# Patient Record
Sex: MALE | Race: WHITE | Employment: OTHER | URBAN - METROPOLITAN AREA
[De-identification: names, ages, dates, MRNs, and addresses within clinical notes are randomized per-mention and may not be internally consistent; named-entity substitution may affect disease eponyms.]

---

## 2021-05-03 DIAGNOSIS — Z01.812 BLOOD TESTS PRIOR TO TREATMENT OR PROCEDURE: ICD-10-CM

## 2021-05-03 RX ORDER — CEFAZOLIN SODIUM 2 G/50ML
2 SOLUTION INTRAVENOUS ONCE
Status: CANCELLED | OUTPATIENT
Start: 2021-05-11 | End: 2021-05-11

## 2021-05-06 ENCOUNTER — HOSPITAL ENCOUNTER (OUTPATIENT)
Dept: PREADMISSION TESTING | Age: 66
Discharge: HOME | End: 2021-05-06
Payer: MEDICARE

## 2021-05-06 PROCEDURE — U0003 INFECTIOUS AGENT DETECTION BY NUCLEIC ACID (DNA OR RNA); SEVERE ACUTE RESPIRATORY SYNDROME CORONAVIRUS 2 (SARS-COV-2) (CORONAVIRUS DISEASE [COVID-19]), AMPLIFIED PROBE TECHNIQUE, MAKING USE OF HIGH THROUGHPUT TECHNOLOGIES AS DESCRIBED BY CMS-2020-01-R: HCPCS

## 2021-05-07 LAB — SARS-COV-2, COV2NT: NOT DETECTED

## 2021-05-10 ENCOUNTER — ANESTHESIA EVENT (OUTPATIENT)
Dept: SURGERY | Age: 66
End: 2021-05-10
Payer: MEDICARE

## 2021-05-11 ENCOUNTER — APPOINTMENT (OUTPATIENT)
Dept: GENERAL RADIOLOGY | Age: 66
End: 2021-05-11
Attending: UROLOGY
Payer: MEDICARE

## 2021-05-11 ENCOUNTER — ANESTHESIA (OUTPATIENT)
Dept: SURGERY | Age: 66
End: 2021-05-11
Payer: MEDICARE

## 2021-05-11 ENCOUNTER — HOSPITAL ENCOUNTER (OUTPATIENT)
Age: 66
Discharge: HOME | End: 2021-05-11
Attending: UROLOGY | Admitting: UROLOGY
Payer: MEDICARE

## 2021-05-11 VITALS
RESPIRATION RATE: 16 BRPM | OXYGEN SATURATION: 94 % | TEMPERATURE: 98 F | SYSTOLIC BLOOD PRESSURE: 158 MMHG | DIASTOLIC BLOOD PRESSURE: 90 MMHG | BODY MASS INDEX: 32.07 KG/M2 | HEART RATE: 53 BPM | WEIGHT: 224 LBS | HEIGHT: 70 IN

## 2021-05-11 DIAGNOSIS — N20.0 KIDNEY STONE: Primary | ICD-10-CM

## 2021-05-11 LAB
AMPHET UR QL SCN: NEGATIVE
BARBITURATES UR QL SCN: NEGATIVE
BENZODIAZ UR QL: NEGATIVE
CANNABINOIDS UR QL SCN: POSITIVE
COCAINE UR QL SCN: NEGATIVE
HDSCOM,HDSCOM: ABNORMAL
METHADONE UR QL: NEGATIVE
OPIATES UR QL: NEGATIVE
PCP UR QL: NEGATIVE

## 2021-05-11 PROCEDURE — 74011000250 HC RX REV CODE- 250: Performed by: NURSE ANESTHETIST, CERTIFIED REGISTERED

## 2021-05-11 PROCEDURE — 74011250636 HC RX REV CODE- 250/636: Performed by: UROLOGY

## 2021-05-11 PROCEDURE — 74018 RADEX ABDOMEN 1 VIEW: CPT

## 2021-05-11 PROCEDURE — 77030013079 HC BLNKT BAIR HGGR 3M -A: Performed by: ANESTHESIOLOGY

## 2021-05-11 PROCEDURE — 74011250636 HC RX REV CODE- 250/636: Performed by: NURSE ANESTHETIST, CERTIFIED REGISTERED

## 2021-05-11 PROCEDURE — 77030006974 HC BSKT URET RTVR BSC -C: Performed by: UROLOGY

## 2021-05-11 PROCEDURE — C2617 STENT, NON-COR, TEM W/O DEL: HCPCS | Performed by: UROLOGY

## 2021-05-11 PROCEDURE — 76210000021 HC REC RM PH II 0.5 TO 1 HR: Performed by: UROLOGY

## 2021-05-11 PROCEDURE — 77030008683 HC TU ET CUF COVD -A: Performed by: ANESTHESIOLOGY

## 2021-05-11 PROCEDURE — 74011000636 HC RX REV CODE- 636: Performed by: UROLOGY

## 2021-05-11 PROCEDURE — 74011250637 HC RX REV CODE- 250/637: Performed by: NURSE ANESTHETIST, CERTIFIED REGISTERED

## 2021-05-11 PROCEDURE — 77030040361 HC SLV COMPR DVT MDII -B: Performed by: UROLOGY

## 2021-05-11 PROCEDURE — 80307 DRUG TEST PRSMV CHEM ANLYZR: CPT

## 2021-05-11 PROCEDURE — C1769 GUIDE WIRE: HCPCS | Performed by: UROLOGY

## 2021-05-11 PROCEDURE — 76010000162 HC OR TIME 1.5 TO 2 HR INTENSV-TIER 1: Performed by: UROLOGY

## 2021-05-11 PROCEDURE — 00910 ANES TRANSURETHRAL PX NOS: CPT | Performed by: ANESTHESIOLOGY

## 2021-05-11 PROCEDURE — C1894 INTRO/SHEATH, NON-LASER: HCPCS | Performed by: UROLOGY

## 2021-05-11 PROCEDURE — 77030040922 HC BLNKT HYPOTHRM STRY -A: Performed by: UROLOGY

## 2021-05-11 PROCEDURE — 76210000016 HC OR PH I REC 1 TO 1.5 HR: Performed by: UROLOGY

## 2021-05-11 PROCEDURE — 2709999900 HC NON-CHARGEABLE SUPPLY: Performed by: UROLOGY

## 2021-05-11 PROCEDURE — 76060000034 HC ANESTHESIA 1.5 TO 2 HR: Performed by: UROLOGY

## 2021-05-11 PROCEDURE — 77030019927 HC TBNG IRR CYSTO BAXT -A: Performed by: UROLOGY

## 2021-05-11 PROCEDURE — 00910 ANES TRANSURETHRAL PX NOS: CPT | Performed by: NURSE ANESTHETIST, CERTIFIED REGISTERED

## 2021-05-11 DEVICE — IMPLANTABLE DEVICE: Type: IMPLANTABLE DEVICE | Site: URETER | Status: FUNCTIONAL

## 2021-05-11 RX ORDER — LIDOCAINE HYDROCHLORIDE 10 MG/ML
0.1 INJECTION, SOLUTION EPIDURAL; INFILTRATION; INTRACAUDAL; PERINEURAL AS NEEDED
Status: DISCONTINUED | OUTPATIENT
Start: 2021-05-11 | End: 2021-05-11 | Stop reason: HOSPADM

## 2021-05-11 RX ORDER — KETOROLAC TROMETHAMINE 15 MG/ML
INJECTION, SOLUTION INTRAMUSCULAR; INTRAVENOUS AS NEEDED
Status: DISCONTINUED | OUTPATIENT
Start: 2021-05-11 | End: 2021-05-11 | Stop reason: HOSPADM

## 2021-05-11 RX ORDER — OXYCODONE AND ACETAMINOPHEN 5; 325 MG/1; MG/1
1 TABLET ORAL
Qty: 8 TAB | Refills: 0 | Status: SHIPPED | OUTPATIENT
Start: 2021-05-11 | End: 2021-05-11 | Stop reason: SDUPTHER

## 2021-05-11 RX ORDER — CEFAZOLIN SODIUM 2 G/50ML
2 SOLUTION INTRAVENOUS ONCE
Status: COMPLETED | OUTPATIENT
Start: 2021-05-11 | End: 2021-05-11

## 2021-05-11 RX ORDER — SODIUM CHLORIDE 0.9 % (FLUSH) 0.9 %
5-40 SYRINGE (ML) INJECTION AS NEEDED
Status: DISCONTINUED | OUTPATIENT
Start: 2021-05-11 | End: 2021-05-11 | Stop reason: HOSPADM

## 2021-05-11 RX ORDER — FAMOTIDINE 20 MG/1
20 TABLET, FILM COATED ORAL ONCE
Status: COMPLETED | OUTPATIENT
Start: 2021-05-11 | End: 2021-05-11

## 2021-05-11 RX ORDER — EPHEDRINE SULFATE/0.9% NACL/PF 50 MG/5 ML
SYRINGE (ML) INTRAVENOUS AS NEEDED
Status: DISCONTINUED | OUTPATIENT
Start: 2021-05-11 | End: 2021-05-11 | Stop reason: HOSPADM

## 2021-05-11 RX ORDER — GLYCOPYRROLATE 0.2 MG/ML
INJECTION INTRAMUSCULAR; INTRAVENOUS AS NEEDED
Status: DISCONTINUED | OUTPATIENT
Start: 2021-05-11 | End: 2021-05-11 | Stop reason: HOSPADM

## 2021-05-11 RX ORDER — DOCUSATE SODIUM 100 MG/1
100 CAPSULE, LIQUID FILLED ORAL 2 TIMES DAILY
Qty: 60 CAP | Refills: 2 | Status: SHIPPED | OUTPATIENT
Start: 2021-05-11 | End: 2021-08-09

## 2021-05-11 RX ORDER — MIDAZOLAM HYDROCHLORIDE 1 MG/ML
INJECTION, SOLUTION INTRAMUSCULAR; INTRAVENOUS AS NEEDED
Status: DISCONTINUED | OUTPATIENT
Start: 2021-05-11 | End: 2021-05-11 | Stop reason: HOSPADM

## 2021-05-11 RX ORDER — PROPOFOL 10 MG/ML
INJECTION, EMULSION INTRAVENOUS AS NEEDED
Status: DISCONTINUED | OUTPATIENT
Start: 2021-05-11 | End: 2021-05-11 | Stop reason: HOSPADM

## 2021-05-11 RX ORDER — OXYCODONE AND ACETAMINOPHEN 5; 325 MG/1; MG/1
1 TABLET ORAL AS NEEDED
Status: COMPLETED | OUTPATIENT
Start: 2021-05-11 | End: 2021-05-11

## 2021-05-11 RX ORDER — FENTANYL CITRATE 50 UG/ML
25 INJECTION, SOLUTION INTRAMUSCULAR; INTRAVENOUS AS NEEDED
Status: DISCONTINUED | OUTPATIENT
Start: 2021-05-11 | End: 2021-05-11 | Stop reason: HOSPADM

## 2021-05-11 RX ORDER — DEXTROSE 50 % IN WATER (D50W) INTRAVENOUS SYRINGE
25-50 AS NEEDED
Status: DISCONTINUED | OUTPATIENT
Start: 2021-05-11 | End: 2021-05-11 | Stop reason: HOSPADM

## 2021-05-11 RX ORDER — FENTANYL CITRATE 50 UG/ML
50 INJECTION, SOLUTION INTRAMUSCULAR; INTRAVENOUS
Status: DISCONTINUED | OUTPATIENT
Start: 2021-05-11 | End: 2021-05-11 | Stop reason: HOSPADM

## 2021-05-11 RX ORDER — SODIUM CHLORIDE, SODIUM LACTATE, POTASSIUM CHLORIDE, CALCIUM CHLORIDE 600; 310; 30; 20 MG/100ML; MG/100ML; MG/100ML; MG/100ML
25 INJECTION, SOLUTION INTRAVENOUS CONTINUOUS
Status: DISCONTINUED | OUTPATIENT
Start: 2021-05-11 | End: 2021-05-11 | Stop reason: HOSPADM

## 2021-05-11 RX ORDER — ROCURONIUM BROMIDE 10 MG/ML
INJECTION, SOLUTION INTRAVENOUS AS NEEDED
Status: DISCONTINUED | OUTPATIENT
Start: 2021-05-11 | End: 2021-05-11 | Stop reason: HOSPADM

## 2021-05-11 RX ORDER — LIDOCAINE HYDROCHLORIDE 20 MG/ML
INJECTION, SOLUTION EPIDURAL; INFILTRATION; INTRACAUDAL; PERINEURAL AS NEEDED
Status: DISCONTINUED | OUTPATIENT
Start: 2021-05-11 | End: 2021-05-11 | Stop reason: HOSPADM

## 2021-05-11 RX ORDER — OXYBUTYNIN CHLORIDE 5 MG/1
5 TABLET ORAL 3 TIMES DAILY
Qty: 30 TAB | Refills: 0 | Status: SHIPPED | OUTPATIENT
Start: 2021-05-11 | End: 2021-05-21

## 2021-05-11 RX ORDER — ONDANSETRON 2 MG/ML
INJECTION INTRAMUSCULAR; INTRAVENOUS AS NEEDED
Status: DISCONTINUED | OUTPATIENT
Start: 2021-05-11 | End: 2021-05-11 | Stop reason: HOSPADM

## 2021-05-11 RX ORDER — OXYCODONE AND ACETAMINOPHEN 5; 325 MG/1; MG/1
1 TABLET ORAL ONCE
Status: CANCELLED | OUTPATIENT
Start: 2021-05-11 | End: 2021-05-12

## 2021-05-11 RX ORDER — FENTANYL CITRATE 50 UG/ML
INJECTION, SOLUTION INTRAMUSCULAR; INTRAVENOUS AS NEEDED
Status: DISCONTINUED | OUTPATIENT
Start: 2021-05-11 | End: 2021-05-11 | Stop reason: HOSPADM

## 2021-05-11 RX ORDER — MAGNESIUM SULFATE 100 %
4 CRYSTALS MISCELLANEOUS AS NEEDED
Status: DISCONTINUED | OUTPATIENT
Start: 2021-05-11 | End: 2021-05-11 | Stop reason: HOSPADM

## 2021-05-11 RX ORDER — KETOROLAC TROMETHAMINE 10 MG/1
10 TABLET, FILM COATED ORAL
Qty: 20 TAB | Refills: 0 | Status: SHIPPED | OUTPATIENT
Start: 2021-05-11

## 2021-05-11 RX ORDER — SUCCINYLCHOLINE CHLORIDE 100 MG/5ML
SYRINGE (ML) INTRAVENOUS AS NEEDED
Status: DISCONTINUED | OUTPATIENT
Start: 2021-05-11 | End: 2021-05-11 | Stop reason: HOSPADM

## 2021-05-11 RX ORDER — NEOSTIGMINE METHYLSULFATE 1 MG/ML
INJECTION, SOLUTION INTRAVENOUS AS NEEDED
Status: DISCONTINUED | OUTPATIENT
Start: 2021-05-11 | End: 2021-05-11 | Stop reason: HOSPADM

## 2021-05-11 RX ORDER — SODIUM CHLORIDE 0.9 % (FLUSH) 0.9 %
5-40 SYRINGE (ML) INJECTION EVERY 8 HOURS
Status: DISCONTINUED | OUTPATIENT
Start: 2021-05-11 | End: 2021-05-11 | Stop reason: HOSPADM

## 2021-05-11 RX ADMIN — Medication 120 MG: at 10:08

## 2021-05-11 RX ADMIN — FENTANYL CITRATE 50 MCG: 50 INJECTION, SOLUTION INTRAMUSCULAR; INTRAVENOUS at 11:01

## 2021-05-11 RX ADMIN — LIDOCAINE HYDROCHLORIDE 40 MG: 20 INJECTION, SOLUTION EPIDURAL; INFILTRATION; INTRACAUDAL; PERINEURAL at 10:08

## 2021-05-11 RX ADMIN — ROCURONIUM BROMIDE 30 MG: 50 INJECTION INTRAVENOUS at 10:11

## 2021-05-11 RX ADMIN — Medication 10 MG: at 11:19

## 2021-05-11 RX ADMIN — GLYCOPYRROLATE 0.4 MG: 0.2 INJECTION INTRAMUSCULAR; INTRAVENOUS at 11:31

## 2021-05-11 RX ADMIN — SODIUM CHLORIDE, SODIUM LACTATE, POTASSIUM CHLORIDE, AND CALCIUM CHLORIDE 25 ML/HR: 600; 310; 30; 20 INJECTION, SOLUTION INTRAVENOUS at 09:46

## 2021-05-11 RX ADMIN — KETOROLAC TROMETHAMINE 15 MG: 15 INJECTION, SOLUTION INTRAMUSCULAR; INTRAVENOUS at 11:31

## 2021-05-11 RX ADMIN — FAMOTIDINE 20 MG: 20 TABLET, FILM COATED ORAL at 09:22

## 2021-05-11 RX ADMIN — FENTANYL CITRATE 50 MCG: 50 INJECTION, SOLUTION INTRAMUSCULAR; INTRAVENOUS at 10:08

## 2021-05-11 RX ADMIN — Medication 3 MG: at 11:31

## 2021-05-11 RX ADMIN — PROPOFOL 100 MG: 10 INJECTION, EMULSION INTRAVENOUS at 10:08

## 2021-05-11 RX ADMIN — ROCURONIUM BROMIDE 10 MG: 50 INJECTION INTRAVENOUS at 10:57

## 2021-05-11 RX ADMIN — MIDAZOLAM HYDROCHLORIDE 2 MG: 2 INJECTION, SOLUTION INTRAMUSCULAR; INTRAVENOUS at 09:58

## 2021-05-11 RX ADMIN — CEFAZOLIN SODIUM 2 G: 2 SOLUTION INTRAVENOUS at 10:14

## 2021-05-11 RX ADMIN — ONDANSETRON 4 MG: 2 INJECTION INTRAMUSCULAR; INTRAVENOUS at 11:31

## 2021-05-11 RX ADMIN — OXYCODONE HYDROCHLORIDE AND ACETAMINOPHEN 1 TABLET: 5; 325 TABLET ORAL at 13:31

## 2021-05-11 NOTE — DISCHARGE INSTRUCTIONS
Patient Education        Shock Wave Lithotripsy: What to Expect at Home  Your Recovery     Lithotripsy is a way to treat kidney stones without surgery. It is also called extracorporeal shock wave lithotripsy, or ESWL. This treatment uses sound waves to break kidney stones into tiny pieces. These pieces can then pass out of the body in the urine. You may have a small amount of blood in your urine after this treatment. Your urine may be slightly pink or reddish. The blood in the urine often goes away after 2 days. You may have a plastic tube inside one of your ureters. Ureters are the tubes that connect the kidneys to the bladder. The plastic tube is called a stent. It takes urine from your kidney to your bladder. This lets the stone pass more easily. Your doctor may remove the stent in about a week or two. This care sheet gives you a general idea about how long it will take for you to recover. But each person recovers at a different pace. Follow the steps below to feel better as quickly as possible. How can you care for yourself at home? Activity    · Rest as much as you need to after you go home.     · You may do your regular activities. But avoid hard exercise or sports for a week. Wait until there is no blood in your urine and the stent is out. Diet    · You can eat your normal diet.     · Drink plenty of fluids. If you have kidney, heart, or liver disease and have to limit fluids, talk with your doctor before you increase the amount of fluids you drink. Medicines    · Your doctor will tell you if and when you can restart your medicines. You will also be given instructions about taking any new medicines.     · If you take aspirin or some other blood thinner, ask your doctor if and when to start taking it again. Make sure that you understand exactly what your doctor wants you to do.     · Be safe with medicines. Read and follow all instructions on the label.   ? If the doctor gave you a prescription medicine for pain, take it as prescribed. ? If you are not taking a prescription pain medicine, ask your doctor if you can take acetaminophen (Tylenol). Do not take ibuprofen (Advil, Motrin) or naproxen (Aleve), or similar medicines unless your doctor tells you to. ? Do not take two or more pain medicines at the same time unless the doctor told you to. Many pain medicines have acetaminophen, which is Tylenol. Too much acetaminophen (Tylenol) can be harmful. Other instructions    · Urinate through the strainer the doctor gives you. Save any stone pieces, including those that look like sand or gravel. Take these to your doctor. This will help your doctor find the cause of your stones. Follow-up care is a key part of your treatment and safety. Be sure to make and go to all appointments, and call your doctor if you are having problems. It's also a good idea to know your test results and keep a list of the medicines you take. When should you call for help? Call 911 anytime you think you may need emergency care. For example, call if:    · You passed out (lost consciousness).     · You have chest pain, are short of breath, or cough up blood. Call your doctor now or seek immediate medical care if:    · You have pain that does not get better after you take pain medicine.     · You have new or more blood clots in your urine. (It is normal for the urine to be pink for a few days.)     · You cannot urinate.     · You have symptoms of a urinary tract infection. These may include:  ? Pain or burning when you urinate. ? A frequent need to urinate without being able to pass much urine. ? Pain in the flank, which is just below the rib cage and above the waist on either side of the back. ? Blood in the urine.   ? A fever.     · You are sick to your stomach or cannot drink fluids.     · You have signs of a blood clot in your leg (called a deep vein thrombosis), such as:  ? Pain in the calf, back of the knee, thigh, or groin.  ? Redness and swelling in your leg. Watch closely for any changes in your health, and be sure to contact your doctor if you have any problems. Where can you learn more? Go to http://www.gray.com/  Enter K751 in the search box to learn more about \"Shock Wave Lithotripsy: What to Expect at Home. \"  Current as of: December 17, 2020               Content Version: 12.8  © 2006-2021 [employer]. Care instructions adapted under license by [employer] (which disclaims liability or warranty for this information). If you have questions about a medical condition or this instruction, always ask your healthcare professional. [place] [address] any warranty or liability for your use of this information. Patient Education        Ureteral Stent Placement: What to Expect at Home  Your Recovery     A ureteral (say \"you-REE-ter-ul\") stent is a thin, hollow tube that is placed in the ureter to help urine pass from the kidney into the bladder. Ureters are the tubes that connect the kidneys to the bladder. You may have a small amount of blood in your urine for 1 to 3 days after the procedure. While the stent is in place, you may have to urinate more often, feel a sudden need to urinate, or feel like you can't completely empty your bladder. You may feel some pain when you urinate or do strenuous activity. You also may notice a small amount of blood in your urine after strenuous activities. These side effects usually don't prevent people from doing their normal daily activities. You may have a thin string coming out of your urethra. Your urethra is the tube that carries urine from your bladder to outside your body. This string is attached to the stent. Try not to pull on the string. It will be used to pull out the stent when you no longer need it. After the procedure, urine may flow better from your kidneys to your bladder.  A ureteral stent may be left in place for several days or for as long as several months. Your doctor will take it out when you no longer need it. Or, in some cases, it may be taken out at home. This care sheet gives you a general idea about how long it will take for you to recover. But each person recovers at a different pace. Follow the steps below to get better as quickly as possible. How can you care for yourself at home? Activity    · Rest when you feel tired. Getting enough sleep will help you recover.     · Avoid strenuous activities, such as bicycle riding, jogging, weight lifting, or aerobic exercise, until your doctor says it is okay.     · Ask your doctor when you can drive again.     · Most people are able to return to work the day after the procedure. If your work requires intense activity, you may feel pain in your kidney area or get tired easily. If this happens, you may need to do less strenuous activities while the stent is in.     · Ask your doctor when it is okay for you to have sex. Diet    · You can eat your normal diet. If your stomach is upset, try bland, low-fat foods like plain rice, broiled chicken, toast, and yogurt.     · Drink plenty of fluids (unless your doctor tells you not to). Medicines    · Your doctor will tell you if and when you can restart your medicines. You will also get instructions about taking any new medicines.     · If you take aspirin or some other blood thinner, ask your doctor if and when to start taking it again. Make sure that you understand exactly what your doctor wants you to do.     · Be safe with medicines. Take pain medicines exactly as directed. ? If the doctor gave you a prescription medicine for pain, take it as prescribed.   ? If you are not taking a prescription pain medicine, ask your doctor if you can take an over-the-counter medicine.     · If you think your pain medicine is making you sick to your stomach:  ? Take your medicine after meals (unless your doctor has told you not to). ? Ask your doctor for a different pain medicine.     · If your doctor prescribed antibiotics, take them as directed. Do not stop taking them just because you feel better. You need to take the full course of antibiotics. Follow-up care is a key part of your treatment and safety. Be sure to make and go to all appointments, and call your doctor if you are having problems. It's also a good idea to know your test results and keep a list of the medicines you take. When should you call for help? Call 911 anytime you think you may need emergency care. For example, call if:    · You passed out (lost consciousness).     · You have severe trouble breathing.     · You have sudden chest pain and shortness of breath, or you cough up blood.     · You have severe belly pain. Call your doctor now or seek immediate medical care if:    · Part or all of the stent comes out of your urethra.     · You have pain that does not get better after you take pain medicine.     · You have symptoms of a urinary infection. For example:  ? You have blood or pus in your urine. ? You have pain in your back just below your rib cage. This is called flank pain. ? You have a fever, chills, or body aches. ? It hurts to urinate. ? You have groin or belly pain.     · You cannot control when you urinate, or you leak urine. Watch closely for changes in your health, and be sure to contact your doctor if you have any problems. Where can you learn more? Go to http://www.gray.com/  Enter B869 in the search box to learn more about \"Ureteral Stent Placement: What to Expect at Home. \"  Current as of: June 29, 2020               Content Version: 12.8  © 2362-8328 [employer]. Care instructions adapted under license by [employer] (which disclaims liability or warranty for this information).  If you have questions about a medical condition or this instruction, always ask your healthcare professional. [address] any warranty or liability for your use of this information.        .Patient armband removed and shredded

## 2021-05-11 NOTE — ANESTHESIA PREPROCEDURE EVALUATION
Relevant Problems   No relevant active problems       Anesthetic History   No history of anesthetic complications            Review of Systems / Medical History  Patient summary reviewed and pertinent labs reviewed    Pulmonary    COPD: mild    Sleep apnea: CPAP  Smoker         Neuro/Psych   Within defined limits           Cardiovascular    Hypertension          CAD and PAD    Exercise tolerance: <4 METS  Comments: NICM   GI/Hepatic/Renal  Within defined limits              Endo/Other        Obesity     Other Findings              Physical Exam    Airway  Mallampati: III  TM Distance: > 6 cm  Neck ROM: normal range of motion   Mouth opening: Normal     Cardiovascular  Regular rate and rhythm,  S1 and S2 normal,  no murmur, click, rub, or gallop             Dental    Dentition: Poor dentition, Upper partial plate and Lower partial plate     Pulmonary      Decreased breath sounds: bilateral           Abdominal  GI exam deferred       Other Findings            Anesthetic Plan    ASA: 4  Anesthesia type: general          Induction: Intravenous  Anesthetic plan and risks discussed with: Patient

## 2021-05-11 NOTE — H&P
ASSESSMENT:   1. Bilateral urolithiasis    PLAN:    1. Bilateral ureteroscopy, laser litho, bilateral ureteral stent placement           CC: bilateral urolithiasis    HISTORY OF PRESENT ILLNESS:  Cheri Dsouza is a 72 y.o. male who is seen preop. History of bilateral urolithiasis    No fevers, chills, nausea, vomiting. No new issues. AUA Symptom Score 10/2/2014   Over the past month how often have you had the sensation that your bladder was not completely empty after you finished urinating? 2   Over the past month, how often have had to urinate again less than 2 hours after you last finished urinating? 4   Over the past month, how often have you found you stopped and started again several times when you urinated? 3   Over the past month, how often have you found it difficult to postpone urination? 3   Over the past month, how often have you had a weak urinary stream? 2   Over the past month, how often have you had to push or strain to begin urinating? 2   Over the past month, how many times did you most typically get up to urinate from the time you went to bed at night until the time you got up in the morning?  2   AUA Score 18         Past Medical History:   Diagnosis Date    Cardiomyopathy ([address]     Chronic obstructive pulmonary disease (HCC)     Hernia, hiatal     Hypertension     Kidney stone     Sleep apnea     not using cpap       Past Surgical History:   Procedure Laterality Date    HX LITHOTRIPSY      right UTS, laser lithotripsy 9/26/14    HX ORTHOPAEDIC      Left ring finger re-attachment    VASCULAR SURGERY PROCEDURE UNLIST Right 2019    Stent SFA       Social History     Tobacco Use    Smoking status: Current Some Day Smoker     Packs/day: 1.00     Types: Cigarettes    Smokeless tobacco: Never Used   Substance Use Topics    Alcohol use: No    Drug use: Yes     Types: Marijuana, Cocaine     Comment: oc marijuana, cocaine 30  yrs ago       No Known Allergies    Family History Problem Relation Age of Onset    Cancer Mother         colon       Current Facility-Administered Medications   Medication Dose Route Frequency Provider Last Rate Last Admin    lidocaine (PF) (XYLOCAINE) 10 mg/mL (1 %) injection 0.1 mL  0.1 mL SubCUTAneous PRN Anthony Wright CRNA        lactated Ringers infusion  25 mL/hr IntraVENous CONTINUOUS Anthony Wright CRNA        sodium chloride (NS) flush 5-40 mL  5-40 mL IntraVENous Q8H Anthony Wright, CRNA        sodium chloride (NS) flush 5-40 mL  5-40 mL IntraVENous PRN Anthony Wright CRNA        famotidine (PEPCID) tablet 20 mg  20 mg Oral ONCE Anthony Wright CRNA        ceFAZolin (ANCEF) 2g IVPB in 50 mL D5W  2 g IntraVENous ONCE Jo Castro MD           Review of Systems  Pertinent findings noted in hpi. Otherwise negative review. PHYSICAL EXAMINATION:   Visit Vitals  BP (!) 142/91 (BP 1 Location: Left upper arm, BP Patient Position: At rest)   Pulse 66   Temp 98.1 °F (36.7 °C)   Resp 20   Ht 5' 10\" (1.778 m)   Wt 220 lb (99.8 kg)   SpO2 97%   BMI 31.57 kg/m²     Constitutional: WDWN, Pleasant and appropriate affect, No acute distress. CV:  No peripheral swelling noted  Respiratory: No respiratory distress or difficulties  Abdomen:  No abdominal masses or tenderness. No CVA tenderness. No inguinal hernias noted. Skin: No jaundice. Neuro/Psych:  Alert and oriented x 3, affect appropriate. Lymphatic:   No enlarged inguinal lymph nodes. REVIEW OF LABS AND IMAGING:    Results for orders placed or performed during the hospital encounter of 05/06/21   NOVEL CORONAVIRUS (COVID-19)   Result Value Ref Range    SARS-CoV-2 Not Detected Not Detected         Lab Results   Component Value Date/Time    Prostate Specific Ag 3.62 08/21/2020 03:39 PM       CT Results:  Results from Hospital Encounter encounter on 08/06/20   CT ABD PELV W CONT    Narrative INDICATION: Chest/abdominal pain, weight loss, hematuria.  Rule out acute  abnormality. COMPARISON: 10/20/2009. TECHNIQUE:   CT of the abdomen and pelvis WITH intravenous contrast. Coronal and sagittal  reformatted images were produced. All CT exams at this facility use one or more dose reduction techniques  including automatic exposure control, mA/kV adjustment per patient's size, or  iterative reconstruction technique. DICOM format imaged data is available to  non-affiliated external healthcare facilities or entities on a secure, media  free, reciprocal searchable basis with patient authorization for 12 months  following the date of the study. CONTRAST: 80 mL Isovue-300. COMMENTS:     LOWER THORAX: Obscured by respiratory motion artifact. Small sliding hiatal  hernia noted. HEPATOBILIARY: Simple cyst measuring 2.3 cm in the caudal aspect of the medial  left hepatic lobe. Additional scattered subcentimeter foci of hypoattenuation  are too small to characterize. No suspicious liver lesions. The gallbladder is  unremarkable. No biliary duct dilatation. SPLEEN: No splenomegaly. PANCREAS: Mild atrophy. No focal masses or ductal dilatation. ADRENALS: No adrenal nodules. KIDNEYS/URETERS: Nonobstructing 6 mm calculus in the upper pole of the right  kidney. Nonobstructing 11 mm calculus in the lower pole of the left kidney. Multiple bilateral simple renal cysts. No solid renal masses. No hydronephrosis. PELVIC ORGANS/BLADDER: Enlarged prostate, measuring 4.0 x 5.7 cm. The bladder is  nondistended. PERITONEUM / RETROPERITONEUM: No free air or fluid. LYMPH NODES: No enlarged mesenteric, retroperitoneal, or pelvic lymph nodes. VESSELS: Atherosclerotic vascular calcifications. Mild ectasia of infrarenal  abdominal aorta. GI TRACT: Normal caliber with no obstruction. No wall thickening. Mild  diverticulosis. Normal appendix.  Small round area of mild infiltration of the  mesenteric fat adjacent to the proximal sigmoid colon measuring 2.3 cm    BONES AND SOFT TISSUES: Mild degenerative changes of the spine. No significant  soft tissue abnormality. Impression IMPRESSION:   1. Small round area of infiltration of the mesenteric fat adjacent to the  proximal sigmoid colon, may be epiploic appendagitis or mesenteric fat necrosis. 2.  Bilateral nonobstructing renal calculi. 3.  Diverticulosis. 4.  Enlarged prostate. 5.  Simple hepatic cyst.         US Results:  Results from Orders Only encounter on 12/15/14   US RETROPERITONEUM COMP    Narrative Auth Prov: Marlen Dukes  CC Prov:                   [facility] Ultrasound  [id number] [facility] [id number]          Imaging Result        Name:     Indra Leon ([id number])  Sex: Male  :  1955  61year old  Patient Class: Outpatient Private  Diagnosis:  Kidney stones [592.0 (ICD-9-CM)]                      Procedures Performed:  Exam Date/Time:  Reason for Exam:    US RETROPERITONEAL  [id number]   2014  9:00 AM    None Specified                    Ultrasound, Retroperitoneal Limited     Transabdominal ultrasound performed over the kidneys and  abdominal aorta. Clinical History: Kidney stones. Comparison:  CT urogram dated 2014. FINDINGS:        The abdominal aorta is normal in caliber without evidence of  aneurysmal dilatation. The proximal abdominal aorta measures 2.6  cm, the mid portion measures 1.9 cm, and the distal portion  measures 1.8 cm. The central IVC is patent. The right kidney is normal in size, shape and echotexture. There  is a 1 cm cyst in the lower pole of the right kidney. There is a  1.2 cm cyst in upper pole the right kidney. No hydronephrosis. No sonographic evidence of nephrolithiasis. The left kidney is normal in size, shape and echotexture. There  is a 1.4 cm cyst in the upper pole the left kidney. There is a  1.4 cm cyst in the midpole of the left kidney. No  hydronephrosis. No sonographic evidence of nephrolithiasis. The right kidney measures 11.8 cm and the left kidney measures  12.2 cm. The prostate gland measures 3.5 x 5.4 x 2.9 cm. IMPRESSION  IMPRESSION:        1. No hydronephrosis. 2.  No definite renal stones identified. 3.  Bilateral renal cysts. 4.  Prominent prostate gland.           Dictated by: Jamaica Francis on Sun Nov 23, 2014  5:08:38 PM EST       Signed By:Sachi Miranda MD 11/23/2014  5:13 PM                                           [facility] [[id number]]                Elizabeth Covarrubias MD   Urology of [address] [place] [address]

## 2021-05-11 NOTE — OP NOTES
Operative Note      Patient: Virginia Mendieta               Sex: male             MRN: [id number]      YOB: 1955      Age:  72 y.o. Preoperative Diagnosis: Kidney stones [N20.0]    Postoperative Diagnosis:  Kidney stones [N20.0]    Surgeon: Hallie Lua    Anesthesia:  General    Procedure:    1) Cystoscopy  2) bilateral  Ureteroscopy  3) Laser lithotripsy  4) Basket removal of stone  5) bilateral  ureteral stent placement  6) < 1 hour physician fluoroscopy imaging interpretation time    Operative Indication: This is a 72 y.o. male with a history of bilateral  urolithiasis. Their stone burden included an 11 mm left lower pole stone and a 6 mm right upper pole stone. After the risks, benefits, alternatives, and complications were discussed they present now for operative management. Procedure in Detail: The patient was brought to the operative suite. Anesthesia was induced and preoperative antibiotics were administered. They were then placed in the dorsal lithotomy position and their external genitalia was prepped and draped in the usual fashion. A surgical timeout was performed confirming the patient's name, date of birth, laterality, and antibiotics. All were in agreement. A 22 Hungarian cystourethroscope was then inserted into the patients bladder. The urethra revealed no abnormalities. No bladder abnormalities were noted on cystoscopy. A sensor wire was then passed up the left ureteral orifice and up the kidney using fluoroscopic guidance. A semirigid ureteroscope was then passed up the left ureteral orifice and into the ureter. The ureter was then examined. The ureter was somewhat narrow. A second sensor wire was then passed through the ureteroscope and up to the kidney using fluoroscopic guidance. The ureteroscope was removed. A 11/13 fr access sheath was then passed over the wire. I could only pass it to the mid ureter.   The inner stylet and wire were removed. A flexible ureteroscope was passed up to the proximal ureter. Thorough pyeloscopy was performed. Stone burden was noted in the lower pole approximately 11 mm. The laser was introduced into the kidney and the stone burden was laser fragmented in a dusting fashion. I elected not to basket it due to the narrow caliber of the ureter. The ureteroscope was used to examine the ureter in a retrograde fashion as the access sheath was removed. There was no evidence of ureteral perforation. A sensor wire was then passed up the right ureteral orifice and up the kidney using fluoroscopic guidance. A semirigid ureteroscope was then passed up the right ureteral orifice and into the ureter. The ureter was then examined. The ureter was also somewhat narrow. A second sensor wire was then passed through the ureteroscope and up to the kidney using fluoroscopic guidance. The ureteroscope was removed. I passed my flexible ureteroscope over the wire and up to the kidney. Thorough pyeloscopy was performed. Stone burden was noted in the upper pole approximately 6 mm. The laser was introduced into the kidney and the stone burden was laser fragmented in a dusting fashion. I  again elected not to basket it due to the narrow caliber of the ureter. A 6 fr x 28 cm stent was then placed in the standard fashion over our sensor wire on the right. Excellent coil was noted in the kidney and bladder on fluoroscopy. The repeat was then performed on the left. The strings were secured to the penis using benzoin and a tegaderm. The patient was then awoken and transferred to the recovery room in stable condition. Estimated Blood Loss: minimal           Implants:   Implant Name Type Inv.  Item Serial No.  Lot No. LRB No. Used Action   STENT UTER 28CM 6FR W/GDWIRE [[id number]] [[employer]] - [id number]  STENT UTER 28CM 6FR W/GDWIRE [[id number]] [employer] [address] DIV_ [id number] Right 1 Implanted   STENT UTER 28CM 6FR W/GDWIRE [[id number]] [facility] [employer]] - [id number]  STENT UTER 28CM 6FR W/GDWIRE [[id number]] [[employer]]  [employer] [id number] Left 1 Implanted       Specimens: * No specimens in log *     Drains: None           Complications:  None          Counts: Sponge and needle counts were correct times two.     Elizabeth Covarrubias MD  5/11/2021

## 2021-05-11 NOTE — PERIOP NOTES
Pre-Op Summary    Pt arrived via car with family/friend and is oriented to time, place, person and situation. Patient with steady gait with none assistive devices. Visit Vitals  BP (!) 142/91 (BP 1 Location: Left upper arm, BP Patient Position: At rest)   Pulse 66   Temp 98.1 °F (36.7 °C)   Resp 20   Ht 5' 10\" (1.778 m)   Wt 101.6 kg (224 lb)   SpO2 97%   BMI 32.14 kg/m²       Peripheral IV located on Left hand . Patients belongings are located locked in store room. Patient's point of contact is Isaura Sanchez, wife and their contact number is: [phone]. They will be in the waiting room. They are able to receive medication information. They will be their ride home.

## 2021-05-11 NOTE — ANESTHESIA POSTPROCEDURE EVALUATION
Procedure(s):  CYSTOSCOPY/BILATERAL URETEROSCOPY/HOLMIUM LASER LITHOTRIPSY/BILATERAL URETERAL STENT PLACEMENT/BILATERAL RETROGRADE PYELOGRAM/C-ARM. general    Anesthesia Post Evaluation      Multimodal analgesia: multimodal analgesia used between 6 hours prior to anesthesia start to PACU discharge  Patient location during evaluation: PACU  Patient participation: complete - patient participated  Level of consciousness: awake  Pain score: 2  Pain management: adequate  Airway patency: patent  Anesthetic complications: no  Cardiovascular status: acceptable  Respiratory status: acceptable  Hydration status: acceptable  Post anesthesia nausea and vomiting:  none  Final Post Anesthesia Temperature Assessment:  Normothermia (36.0-37.5 degrees C)      INITIAL Post-op Vital signs:   Vitals Value Taken Time   /85 05/11/21 1203   Temp     Pulse 61 05/11/21 1210   Resp 21 05/11/21 1210   SpO2 89 % 05/11/21 1210   Vitals shown include unvalidated device data.

## (undated) DEVICE — KIT CLN UP BON SECOURS MARYV

## (undated) DEVICE — BAG DRAINAGE CUST DISP

## (undated) DEVICE — DRAPE XR C ARM 41X74IN LF --

## (undated) DEVICE — GAUZE,SPONGE,4"X4",16PLY,STRL,LF,10/TRAY: Brand: MEDLINE

## (undated) DEVICE — SKIN MARKER,REGULAR TIP WITH RULER AND LABELS: Brand: DEVON

## (undated) DEVICE — MEDI-VAC NON-CONDUCTIVE SUCTION TUBING: Brand: CARDINAL HEALTH

## (undated) DEVICE — SOLUTION IRRIG 1000ML H2O STRL BLT

## (undated) DEVICE — GARMENT,MEDLINE,DVT,INT,CALF,MED, GEN2: Brand: MEDLINE

## (undated) DEVICE — TRAY PREP DRY W/ PREM GLV 2 APPL 6 SPNG 2 UNDPD 1 OVERWRAP

## (undated) DEVICE — SYR 10ML LUER LOK 1/5ML GRAD --

## (undated) DEVICE — STER SINGLE BASIN SET W/BOWLS: Brand: CARDINAL HEALTH

## (undated) DEVICE — GDWIRE 3CM FLX-TIP 0.038X150CM -- BX/5 SENSOR

## (undated) DEVICE — CHECK-FLO ADAPTER: Brand: CHECK-FLO

## (undated) DEVICE — NITINOL STONE RETRIEVAL BASKET: Brand: ZERO TIP

## (undated) DEVICE — BLANKET WRM AD W50XL85.8IN PACU FULL BODY FORC AIR

## (undated) DEVICE — DRAPE TWL SURG 16X26IN BLU ORB04] ALLCARE INC]

## (undated) DEVICE — GOWN,PREVENTION PLUS,XLN/XL,ST,24/CS: Brand: MEDLINE

## (undated) DEVICE — Y-TYPE TUR/BLADDER IRRIGATION SET, REGULATING CLAMP

## (undated) DEVICE — LUB SURG MEDC STRL 2OZ TUBE MC -- MEDICHOICE

## (undated) DEVICE — Device

## (undated) DEVICE — URETERAL ACCESS SHEATH SET: Brand: NAVIGATOR